# Patient Record
Sex: FEMALE | Race: WHITE | URBAN - METROPOLITAN AREA
[De-identification: names, ages, dates, MRNs, and addresses within clinical notes are randomized per-mention and may not be internally consistent; named-entity substitution may affect disease eponyms.]

---

## 2021-07-13 ENCOUNTER — WEB ENCOUNTER (OUTPATIENT)
Dept: URBAN - METROPOLITAN AREA CLINIC 23 | Facility: CLINIC | Age: 26
End: 2021-07-13

## 2021-07-13 ENCOUNTER — OFFICE VISIT (OUTPATIENT)
Dept: URBAN - METROPOLITAN AREA CLINIC 23 | Facility: CLINIC | Age: 26
End: 2021-07-13
Payer: SELF-PAY

## 2021-07-13 ENCOUNTER — DASHBOARD ENCOUNTERS (OUTPATIENT)
Age: 26
End: 2021-07-13

## 2021-07-13 DIAGNOSIS — R11.2 NAUSEA WITH VOMITING, UNSPECIFIED: ICD-10-CM

## 2021-07-13 DIAGNOSIS — R19.7 DIARRHEA, UNSPECIFIED: ICD-10-CM

## 2021-07-13 DIAGNOSIS — Z59.8 DOES NOT HAVE HEALTH INSURANCE: ICD-10-CM

## 2021-07-13 PROCEDURE — 99204 OFFICE O/P NEW MOD 45 MIN: CPT | Performed by: INTERNAL MEDICINE

## 2021-07-13 PROCEDURE — 3017F COLORECTAL CA SCREEN DOC REV: CPT | Performed by: INTERNAL MEDICINE

## 2021-07-13 PROCEDURE — G8420 CALC BMI NORM PARAMETERS: HCPCS | Performed by: INTERNAL MEDICINE

## 2021-07-13 PROCEDURE — G8427 DOCREV CUR MEDS BY ELIG CLIN: HCPCS | Performed by: INTERNAL MEDICINE

## 2021-07-13 PROCEDURE — 1036F TOBACCO NON-USER: CPT | Performed by: INTERNAL MEDICINE

## 2021-07-13 PROCEDURE — G9903 PT SCRN TBCO ID AS NON USER: HCPCS | Performed by: INTERNAL MEDICINE

## 2021-07-13 PROCEDURE — G9622 NO UNHEAL ETOH USER: HCPCS | Performed by: INTERNAL MEDICINE

## 2021-07-13 SDOH — ECONOMIC STABILITY - INCOME SECURITY: OTHER PROBLEMS RELATED TO HOUSING AND ECONOMIC CIRCUMSTANCES: Z59.8

## 2021-07-13 NOTE — HPI-TODAY'S VISIT:
26-year-old  female presents with 1.5-month history of nausea vomiting diarrhea. Happens every day. Worse in the morning. 2-3 liquidy stools, no blood in stool or vomitus.  Denies recent antibiotic use. No NSAIDs use frequently. Ibuprofen only every few weeks.  She tried Zantac for a few days, initially helped but later did not help.  Her regular bowel habit is 1-2 bowel movements, solid, without excessive straining. She has no insurance. She had ultrasound done with the primary care doctor, normal. No other workup done.

## 2021-07-14 LAB
A/G RATIO: 1.9
ALBUMIN: 4.5
ALKALINE PHOSPHATASE: 70
ALT (SGPT): 11
AST (SGOT): 16
BILIRUBIN, TOTAL: 0.5
BUN/CREATININE RATIO: 6
BUN: 4
CALCIUM: 9.9
CARBON DIOXIDE, TOTAL: 25
CHLORIDE: 105
CREATININE: 0.72
EGFR IF AFRICN AM: 135
EGFR IF NONAFRICN AM: 117
GLOBULIN, TOTAL: 2.4
GLUCOSE: 83
HEMATOCRIT: 43.1
HEMOGLOBIN: 13.8
IMMUNOGLOBULIN A, QN, SERUM: 223
MCH: 28.5
MCHC: 32
MCV: 89
NRBC: (no result)
PLATELETS: 254
POTASSIUM: 4.5
PROTEIN, TOTAL: 6.9
RBC: 4.85
RDW: 12.4
SODIUM: 143
T-TRANSGLUTAMINASE (TTG) IGA: <2
WBC: 7.2